# Patient Record
Sex: FEMALE | Race: AMERICAN INDIAN OR ALASKA NATIVE | HISPANIC OR LATINO
[De-identification: names, ages, dates, MRNs, and addresses within clinical notes are randomized per-mention and may not be internally consistent; named-entity substitution may affect disease eponyms.]

---

## 2018-10-03 ENCOUNTER — TRANSCRIPTION ENCOUNTER (OUTPATIENT)
Age: 38
End: 2018-10-03

## 2018-10-17 ENCOUNTER — TRANSCRIPTION ENCOUNTER (OUTPATIENT)
Age: 38
End: 2018-10-17

## 2019-04-09 ENCOUNTER — TRANSCRIPTION ENCOUNTER (OUTPATIENT)
Age: 39
End: 2019-04-09

## 2019-10-25 ENCOUNTER — TRANSCRIPTION ENCOUNTER (OUTPATIENT)
Age: 39
End: 2019-10-25

## 2019-12-15 ENCOUNTER — TRANSCRIPTION ENCOUNTER (OUTPATIENT)
Age: 39
End: 2019-12-15

## 2020-03-29 ENCOUNTER — TRANSCRIPTION ENCOUNTER (OUTPATIENT)
Age: 40
End: 2020-03-29

## 2020-12-13 ENCOUNTER — TRANSCRIPTION ENCOUNTER (OUTPATIENT)
Age: 40
End: 2020-12-13

## 2020-12-24 ENCOUNTER — TRANSCRIPTION ENCOUNTER (OUTPATIENT)
Age: 40
End: 2020-12-24

## 2021-11-06 ENCOUNTER — TRANSCRIPTION ENCOUNTER (OUTPATIENT)
Age: 41
End: 2021-11-06

## 2023-04-22 ENCOUNTER — EMERGENCY (EMERGENCY)
Facility: HOSPITAL | Age: 43
LOS: 1 days | Discharge: HOME | End: 2023-04-22
Admitting: EMERGENCY MEDICINE
Payer: COMMERCIAL

## 2023-04-22 VITALS
HEART RATE: 88 BPM | DIASTOLIC BLOOD PRESSURE: 75 MMHG | SYSTOLIC BLOOD PRESSURE: 112 MMHG | RESPIRATION RATE: 16 BRPM | WEIGHT: 147.93 LBS | HEIGHT: 62 IN | OXYGEN SATURATION: 99 % | TEMPERATURE: 97 F

## 2023-04-22 PROCEDURE — 99284 EMERGENCY DEPT VISIT MOD MDM: CPT

## 2023-04-22 PROCEDURE — 99283 EMERGENCY DEPT VISIT LOW MDM: CPT

## 2023-04-22 RX ORDER — IPRATROPIUM BROMIDE 21 MCG
2 AEROSOL, SPRAY (ML) NASAL
Qty: 1 | Refills: 0
Start: 2023-04-22 | End: 2023-04-25

## 2023-04-22 NOTE — ED PROVIDER NOTE - CLINICAL SUMMARY MEDICAL DECISION MAKING FREE TEXT BOX
41 y/o female w/ hx anxiety p/w excess mucus/ saliva x approx 1 mo.  States started with nasal congestion/ rhinorrhea and now seems more present in mouth.  Denies pain.  Denies f/c, cough, cp, sob, abd pain, n/v/d.  Saw her PMD, who prescribed her medication for 10 days (unsure what medication) and referred her to ENT.  Did not yet see ENT.   VS WNL  Exam overall reassuring  No signs infx, no signs airway compromise  Pt tolerating secretions  Overall unclear etiology of symptoms, though suspect may be 2/2 rhinitis/ post nasal drip  D/w pt that she should f/u with ENT  Will rx anticholinergic/ discuss otc supportive care

## 2023-04-22 NOTE — ED PROVIDER NOTE - CARE PROVIDER_API CALL
Justine Crowell)  Otolaryngology; Plastic Surgery  [address] [place] [address], [facility] [address]  [place] [address]  Phone: [phone]  Fax: [phone]  Follow Up Time:

## 2023-04-22 NOTE — ED PROVIDER NOTE - NSFOLLOWUPINSTRUCTIONS_ED_ALL_ED_FT
Thank you for visiting [facility] Emergency Department.      You may try ipratropium nasal spray as prescribed.  You may also try over the counter Claritin-D or Benadryl.    Please follow up with an ENT in 1 week for re-evaluation.   Please know that no emergency visit is complete without follow-up with your primary care provider in 1 week.  Please bring copies of all discharge papers and results and show to your doctor.      Please continue taking all previous medications as instructed unless we discussed otherwise.     I appreciated your patience and hope you feel better soon.     Return to ER immediately if you develop fevers, chills, chest pain, shortness of breath, worsening and/or any concerning symptoms.

## 2023-04-22 NOTE — ED PROVIDER NOTE - PHYSICAL EXAMINATION
CONSTITUTIONAL: Awake, alert.  Nontoxic, no acute distress.    HEAD: Normocephalic, atraumatic.    EYES: Conjunctivae clear without exudates or hemorrhage. Sclera is non-icteric.    ENT: ENT:  Ears: External ear normal appearing without tenderness, ear canal clear without discharge or erythema, TM normal in appearance with normal landmarks and cone of light.  No mastoid tenderness, swelling, erythema.  Nose: Normal appearing nose, nasal mucosa is pink and moist. The nasal septum is midline, no septal hematoma. Nares are patent bilaterally.  Throat: Oral mucosa is pink and moist with good dentition. Tongue normal in appearance without lesions and with good symmetrical movement. No buccal nodules or lesions are noted. The pharynx is normal in appearance without tonsillar swelling or exudates.  Uvula midline.  No trismus.  No submandibular/ submental lymphadenopathy.    Tolerating all secretions.  Speaking in full sentences.    NECK: supple, trachea midline.  No stridor    HEART:  Normal rate, regular rhythm.  Heart sounds S1, S2.  No murmurs, rubs or gallops.    LUNGS:  No acute respiratory distress.  Non-tachypneic and non-labored.  Lungs are clear bilaterally with good aeration.  No wheezing, rales, rhonchi.    ABDOMEN: Normal appearing skin without lesions, rashes.  Normal bowel sounds x 4.  Soft, non-distended, non-tender in all four quadrants. No rebound or guarding. No hernias or masses palpable.  No pulsatile abdominal mass.   No CVA tenderness b/l.

## 2023-04-22 NOTE — ED ADULT TRIAGE NOTE - CHIEF COMPLAINT QUOTE
Pt presents to ED C/O " excessive saliva x 1 month" Pt states " The symptoms started with my nose my doctor gave me medicine for a post nasal drip but it didn't help my symptoms. denies throat pain, swelling. Protecting own airway.

## 2023-04-22 NOTE — ED PROVIDER NOTE - OBJECTIVE STATEMENT
41 y/o female w/ hx anxiety p/w excess mucus/ saliva x approx 1 mo.  States started with nasal congestion/ rhinorrhea and now seems more present in mouth.  Denies pain.  Denies f/c, cough, cp, sob, abd pain, n/v/d.  Saw her PMD, who prescribed her medication for 10 days (unsure what medication) and referred her to ENT.  Did not yet see ENT.

## 2023-04-22 NOTE — ED PROVIDER NOTE - PATIENT PORTAL LINK FT
You can access the FollowMyHealth Patient Portal offered by [facility] by registering at the following website: [email or web address]. By joining [facility]’s FollowMyHealth portal, you will also be able to view your health information using other applications (apps) compatible with our system.

## 2023-04-22 NOTE — ED PROVIDER NOTE - NS ED ROS FT
CONSTITUTIONAL: Denies fever and chills    HEENT: See HPI    RESPIRATORY: Denies SOB and cough    CARDIOVASCULAR: Denies palpitations and chest pain.    GASTROINTESTINAL: Denies abdominal pain, nausea, vomiting and diarrhea

## 2023-04-22 NOTE — ED ADULT NURSE NOTE - OBJECTIVE STATEMENT
.  42years female alert mental state (AOX3) received on foot.  -complain of nasal congestion.  pt has nasal congestion for a month. pt took nasal drip from pt PCP. pt present nose discomfort.  -denied chest pain, SOB, fever.  Pt is in the bed comfortably at this time. Will continue to monitor and document any changes. 19-Aug-2018 15:38

## 2023-04-24 DIAGNOSIS — R09.81 NASAL CONGESTION: ICD-10-CM

## 2023-04-24 DIAGNOSIS — K11.7 DISTURBANCES OF SALIVARY SECRETION: ICD-10-CM

## 2023-04-24 DIAGNOSIS — F41.9 ANXIETY DISORDER, UNSPECIFIED: ICD-10-CM

## 2023-04-26 PROBLEM — Z78.9 OTHER SPECIFIED HEALTH STATUS: Chronic | Status: ACTIVE | Noted: 2023-04-22

## 2023-05-01 PROBLEM — Z00.00 ENCOUNTER FOR PREVENTIVE HEALTH EXAMINATION: Status: ACTIVE | Noted: 2023-05-01

## 2023-05-04 ENCOUNTER — APPOINTMENT (OUTPATIENT)
Dept: OTOLARYNGOLOGY | Facility: CLINIC | Age: 43
End: 2023-05-04
Payer: COMMERCIAL

## 2023-05-04 VITALS — HEIGHT: 62 IN | WEIGHT: 145 LBS | BODY MASS INDEX: 26.68 KG/M2

## 2023-05-04 DIAGNOSIS — K11.7 DISTURBANCES OF SALIVARY SECRETION: ICD-10-CM

## 2023-05-04 PROCEDURE — 99203 OFFICE O/P NEW LOW 30 MIN: CPT | Mod: 25

## 2023-05-04 PROCEDURE — 31575 DIAGNOSTIC LARYNGOSCOPY: CPT

## 2023-05-04 RX ORDER — CETIRIZINE HYDROCHLORIDE 5 MG/1
TABLET ORAL
Refills: 0 | Status: ACTIVE | COMMUNITY

## 2023-05-08 NOTE — ASSESSMENT
[FreeTextEntry1] : Clinically I do not see a cause for her problem.\par She will be sent for a modified swallowing study.\par Furthermore pending those results she may require neurologic work-up.\par I explained to her that there is no obstruction that would be causing his problem.\par Furthermore there is not any pooling of secretions in the piriform sinuses or near her airway.

## 2023-05-08 NOTE — HISTORY OF PRESENT ILLNESS
[de-identified] : Initial visit, she is present with her friend today.\par Her chief complaint is "excessive saliva".\par She reports that this problem has been ongoing for 6 months.\par She was at 1 point seen by her primary care physician and treated with an antihistamine.\par At this time she feels that she cannot even talk properly.\par \par She does not report unexplained weight loss, hemoptysis.

## 2023-05-26 ENCOUNTER — APPOINTMENT (OUTPATIENT)
Dept: RADIOLOGY | Facility: HOSPITAL | Age: 43
End: 2023-05-26

## 2023-10-12 NOTE — ED ADULT TRIAGE NOTE - IDEAL BODY WEIGHT(KG)
Instructions from Dr. Marti:      -Please have labs drawn today.    -Please follow up with your GI provider to get an EUS. Please call [phone] to schedule this.    -Please call [phone] to schedule your liver biopsy.     -Follow up in 6 weeks, with labs done prior. You can go to any [facility] lab and you do not need to fast for these labs.          You may receive a survey regarding today's appointment.  We would love to hear from you and appreciate your feedback if you could take the time to fill it out!   
50